# Patient Record
Sex: FEMALE | URBAN - METROPOLITAN AREA
[De-identification: names, ages, dates, MRNs, and addresses within clinical notes are randomized per-mention and may not be internally consistent; named-entity substitution may affect disease eponyms.]

---

## 2017-11-02 ENCOUNTER — TRANSFERRED RECORDS (OUTPATIENT)
Dept: HEALTH INFORMATION MANAGEMENT | Facility: CLINIC | Age: 82
End: 2017-11-02

## 2017-11-06 ENCOUNTER — NURSING HOME VISIT (OUTPATIENT)
Dept: GERIATRICS | Facility: CLINIC | Age: 82
End: 2017-11-06
Payer: COMMERCIAL

## 2017-11-06 VITALS
OXYGEN SATURATION: 97 % | DIASTOLIC BLOOD PRESSURE: 79 MMHG | WEIGHT: 132.4 LBS | RESPIRATION RATE: 20 BRPM | SYSTOLIC BLOOD PRESSURE: 145 MMHG | TEMPERATURE: 97.5 F | HEART RATE: 110 BPM

## 2017-11-06 DIAGNOSIS — M54.50 ACUTE MIDLINE LOW BACK PAIN WITHOUT SCIATICA: ICD-10-CM

## 2017-11-06 DIAGNOSIS — I25.10 CORONARY ARTERY DISEASE INVOLVING NATIVE CORONARY ARTERY OF NATIVE HEART WITHOUT ANGINA PECTORIS: ICD-10-CM

## 2017-11-06 DIAGNOSIS — R41.3 MEMORY DEFICIT: ICD-10-CM

## 2017-11-06 DIAGNOSIS — M15.0 PRIMARY OSTEOARTHRITIS INVOLVING MULTIPLE JOINTS: ICD-10-CM

## 2017-11-06 PROBLEM — R53.82 CHRONIC FATIGUE: Status: ACTIVE | Noted: 2017-05-22

## 2017-11-06 PROBLEM — M79.671 FOOT PAIN, RIGHT: Status: ACTIVE | Noted: 2017-06-06

## 2017-11-06 PROBLEM — M79.641 BILATERAL HAND PAIN: Status: ACTIVE | Noted: 2017-05-22

## 2017-11-06 PROBLEM — M79.642 BILATERAL HAND PAIN: Status: ACTIVE | Noted: 2017-05-22

## 2017-11-06 PROBLEM — M19.90 OSTEOARTHROSIS: Status: ACTIVE | Noted: 2017-11-06

## 2017-11-06 PROCEDURE — 99309 SBSQ NF CARE MODERATE MDM 30: CPT | Performed by: NURSE PRACTITIONER

## 2017-11-06 RX ORDER — ACETAMINOPHEN 325 MG/1
TABLET ORAL
COMMUNITY
Start: 2017-11-04 | End: 2017-11-07

## 2017-11-06 RX ORDER — CALCITONIN SALMON 200 [IU]/.09ML
1 SPRAY, METERED NASAL DAILY
COMMUNITY

## 2017-11-06 NOTE — PROGRESS NOTES
"[place] [facility]  PRIMARY CARE PROVIDER AND CLINIC:  [facility] [place] [facility] [address] [facility] [place]  Chief Complaint   Patient presents with     Clinic Care Coordination - Initial       HPI:    Kristan Aguirre is a 89 year old  (1/11/1920),admitted to the [facility] from Home.   Admitted to this facility for  rehab, medical management and nursing care.  HPI information obtained from: facility chart records, facility staff, patient report and family/first contact daughter, Estrellita Garcia report.  Current issues are:      Compression fracture of vertebra, sequela  Acute midline low back pain without sciatica  Patient had fall in new [facility] elevator on 10/20/17.  Found to have T10 and L1 vertebral compression fracture.  Had persistent pain, unable to manage in home, stayed with daughter, difficult to manage pain, became less mobile, decision made to obtain services at TCU.  Patient has difficulty rating pain.  States it is \"tolerable\".  Sleeping well.  Daughter states she is moving better and not complaining of the pain as much today.  Has participated in physical therapy and OT.  Patient feels this rehab activity is improving her condition/pain.  Appetite good, bowels and bladder moving well, able to control. Denies constipation.  Is not on stool softener despite being on narcotics for over a week.    Memory deficit  Daughter and patient noticing increased short term memory loss over past couple months.  Patient had recently moved into [facility]. Prior to that had been living independently in town house with intermittent assist from neighbors and friends.  SLUMS 23/30.    Primary osteoarthritis involving multiple joints  Chronic condition, managed well with Tylenol    Coronary artery disease involving native coronary artery of native heart without angina pectoris  Denies chest pain.  Has nitro available.       CODE STATUS/ADVANCE DIRECTIVES DISCUSSION:   CPR/Full code   Patient's " living condition: lives alone    ALLERGIES:Clindamycin; Metoprolol; and Penicillins     PAST MEDICAL HISTORY:   Medical History Date Comments   Cervical vertebral fracture (HRC)  Neck Brace   DJD (degenerative joint disease)       ASHD (arteriosclerotic heart disease)       Osteoarthritis   left hip   Abdominal aortic aneurysm (HRC) 3/2011 3.2 cm   Hyperlipidemia LDL goal < 70       Adenomatous colon polyp 2011 small on colonoscopy   Hx of duodenal ulcer 2010          PAST SURGICAL HISTORY:   Surgery Date Laterality Comments   hx total hip replacement     left   BREAST BIOPSY     X2, Neg.   TONSIL AND ADENOIDECTOMY     T & A < 11yo   VEIN STRIPPING     Jose   CORONARY STENT PLACEMENT    2 stents   HYSTERECTOMY 1998   ovaries not removed   CATARACT REMOVAL    Jose Cataract Removal         FAMILY HISTORY:   Cancer Birth Father   kidney   Cancer, Breast Birth Mother       Heart Disease Birth Mother       Other Brother   copd   Good Health Brother         Relation Name Status Comments   Birth Father    (Age 50) kidney cancer   Birth Mother    (Age 73) ashd and breast cancer   Brother    (Age 82) copd   Brother   Alive     Brother         Brother            SOCIAL HISTORY:     Tobacco Use Types Packs/Day Years Used Date   Never Smoker           Smokeless Tobacco: Never Used           Alcohol Use Drinks/Week oz/Week Comments   No           Sex Assigned at Birth Date Recorded   Not on file          Post Discharge Medication Reconciliation Status: discharge medications reconciled, continue medications without change.  Current Outpatient Prescriptions   Medication Sig Dispense Refill     calcitonin, salmon, (MIACALCIN) 200 UNIT/ACT nasal spray Spray 1 spray into one nostril alternating nostrils daily Alternate nostril each day.       Acetaminophen (TYLENOL PO) Take 650 mg by mouth 4 times daily as needed for mild pain or fever       Nitroglycerin (NITROSTAT SL) Place 0.4 mg under  the tongue every 5 minutes as needed for chest pain       oxyCODONE HCl (OXECTA) 5 MG TABA Give 0.5 tablet by mouth every 4 hours as  needed for Pain 1-5       oxyCODONE HCl (OXECTA) 5 MG TABA Give 1 tablet by mouth every 4 hours as  needed for Pain 6-10       acetaminophen (TYLENOL) 325 MG tablet Give 650 mg by mouth as needed for  pain for 3 Days twice a day per standing  house orders         ROS:  10 point ROS of systems including Constitutional, Eyes, Respiratory, Cardiovascular, Gastroenterology, Genitourinary, Integumentary, Muscularskeletal, Psychiatric were all negative except for pertinent positives noted in my HPI.    Exam:  /79  Pulse 110  Temp 97.5  F (36.4  C)  Resp 20  Wt 132 lb 6.4 oz (60.1 kg)  SpO2 97%  GENERAL APPEARANCE:  Alert, in no distress, cooperative  ENT:  Mouth and posterior oropharynx normal, moist mucous membranes, mildly [place], wears glasses  NECK:  No adenopathy,masses or thyromegaly  RESP:  lungs clear to auscultation , no respiratory distress  CV:  regular rate and rhythm, no murmur, rub, or gallop, no edema  ABDOMEN:  normal bowel sounds, soft, nontender, no hepatosplenomegaly or other masses  M/S:   Gait and station abnormal sitting in W/C.  requires SBA for transfers  SKIN:  no concerning lesions noted  NEURO:   Cranial nerves 2-12 are normal tested and grossly at patient's baseline, equal grasp and UE/LE strength  PSYCH:  oriented to self and family.  not date, time, place, memory impaired , affect and mood normal    Lab/Diagnostic data:    XR Lumbar Spine 3 Views (10/24/2017 3:31 PM)  XR Lumbar Spine 3 Views (10/24/2017 3:31 PM)   Specimen Performing Laboratory     [facility]       XR Lumbar Spine 3 Views (10/24/2017 3:31 PM)   Narrative   XR LUMBAR SPINE 3 VIEWS    10/24/2017 3:31 PM        INDICATION: Midline back pain after a fall    COMPARISON: The CT of the abdomen and pelvis 12/12/2015        FINDINGS: 5 lumbar type vertebral bodies. Stable 90% T10 compression  and     40% compression of the L1. No acute vertebral body compression.  No     significant kyphosis.. Stable advanced L3-L4 interbody degeneration. Stable    mild L4-L5 and L5-S1 facet arthropathy.  Prosthetic left hip.       XR Lumbar Spine 3 Views (10/24/2017 3:31 PM)   Procedure Note   Interface, In Rad Results - 10/25/2017 8:53 AM CDT    XR LUMBAR SPINE 3 VIEWS  10/24/2017 3:31 PM    INDICATION: Midline back pain after a fall  COMPARISON: The CT of the abdomen and pelvis 12/12/2015    FINDINGS: 5 lumbar type vertebral bodies. Stable 90% T10 compression and   40% compression of the L1. No acute vertebral body compression. No   significant kyphosis.. Stable advanced L3-L4 interbody degeneration. Stable  mild L4-L5 and L5-S1 facet arthropathy. Prosthetic left hip.     Back to top of Results      XR Knee Rt 2 Views (10/20/2017 1:29 PM)  XR Knee Rt 2 Views (10/20/2017 1:29 PM)   Specimen Performing Laboratory      RADIOLOGY       XR Knee Rt 2 Views (10/20/2017 1:29 PM)   Narrative   XR KNEE RT 2 VIEWS    10/20/2017 1:29 PM        INDICATION: Right knee pain. Fall    COMPARISON: None.        FINDINGS: No fracture, dislocation, or joint effusion. Tricompartmental     chondrocalcinosis and moderate degenerative arthritis. Moderate narrowing     of the lateral compartment. Extensive vascular calcification.       XR Knee Rt 2 Views (10/20/2017 1:29 PM)   Procedure Note   Interface, In Rad Results - 10/20/2017 1:38 PM CDT    XR KNEE RT 2 VIEWS  10/20/2017 1:29 PM    INDICATION: Right knee pain. Fall  COMPARISON: None.    FINDINGS: No fracture, dislocation, or joint effusion. Tricompartmental   chondrocalcinosis and moderate degenerative arthritis. Moderate narrowing   of the lateral compartment. Extensive vascular calcification.     Back to top of Results      CT Head WO IV Cont (10/20/2017 1:21 PM)  CT Head WO IV Cont (10/20/2017 1:21 PM)   Specimen Performing Laboratory      RADIOLOGY       CT Head WO IV Cont  (10/20/2017 1:21 PM)   Narrative   [facility]    CT HEAD WO IV CONT    10/20/2017 1:21 PM        INDICATION: Status post fall, with baseline memory impairment.    TECHNIQUE: Without IV contrast. Dose reduction techniques were used.    CONTRAST: None.    COMPARISON:  None available.        FINDINGS: No intracranial hemorrhage, extraaxial collection, mass effect or    CT evidence of acute territorial infarct.  Age indeterminate lacunar type     infarcts involving the right cerebellar hemisphere and left thalamus, with     additional chronic appearing transcortical infarct of the right     dorsolateral prefrontal cortex. Moderate presumed chronic small vessel     ischemic changes. Mild to moderate generalized volume loss. The ventricles     are proportional to the sulci. Osseous structures are intact. Mild     scattered inflammatory mucosal thickening throughout the paranasal sinuses.    The bilateral temporal bones and orbits are free of significant disease.         CONCLUSION:    1.  No CT finding of mass, acute territorial infarct or hemorrhage.    2.  Age indeterminate lacunar type infarcts involving the right cerebellar     hemisphere and left thalamus, with additional chronic appearing     transcortical infarct of the right dorsolateral prefrontal cortex. If acute    infarct is clinically suspected, consider MRI examination as a more     sensitive means of evaluation.    3.  Age-related changes.       CT Head WO IV Cont (10/20/2017 1:21 PM)   Procedure Note   Interface, In Rad Results - 10/20/2017 1:48 PM CDT    [facility]  CT HEAD WO IV CONT  10/20/2017 1:21 PM    INDICATION: Status post fall, with baseline memory impairment.  TECHNIQUE: Without IV contrast. Dose reduction techniques were used.  CONTRAST: None.  COMPARISON: None available.    FINDINGS: No intracranial hemorrhage, extraaxial collection, mass effect or  CT evidence of acute territorial infarct. Age indeterminate lacunar type    infarcts involving the right cerebellar hemisphere and left thalamus, with   additional chronic appearing transcortical infarct of the right   dorsolateral prefrontal cortex. Moderate presumed chronic small vessel   ischemic changes. Mild to moderate generalized volume loss. The ventricles   are proportional to the sulci. Osseous structures are intact. Mild   scattered inflammatory mucosal thickening throughout the paranasal sinuses.  The bilateral temporal bones and orbits are free of significant disease.     CONCLUSION:  1. No CT finding of mass, acute territorial infarct or hemorrhage.  2. Age indeterminate lacunar type infarcts involving the right cerebellar   hemisphere and left thalamus, with additional chronic appearing   transcortical infarct of the right dorsolateral prefrontal cortex. If acute  infarct is clinically suspected, consider MRI examination as a more   sensitive means of evaluation.  3. Age-related changes.     Back to top of Results      BASIC METABOLIC PANEL (10/20/2017 12:56 PM)  BASIC METABOLIC PANEL (10/20/2017 12:56 PM)   Component Value Ref Range   Sodium 136 136 - 145 mmol/L   Potassium 4.4 3.5 - 5.1 mmol/L   Chloride 104 98 - 109 mmol/L   CO2 24 20 - 29 mmol/L   Anion Gap (calc.) 8 7 - 16 mmol/L   Glucose 99 70 - 180 mg/dl   Calcium 9.5 8.4 - 10.2 mg/dl   BUN 18 7 - 26 mg/dl   Creatinine 0.70 0.55 - 1.02 mg/dl   GFR, Estimated >60 >60 ml/min/1.73m2   GFR, Est., If Black >60 >60 ml/min/1.73m2     BASIC METABOLIC PANEL (10/20/2017 12:56 PM)   Specimen Performing Laboratory     [address] [address]       BASIC METABOLIC PANEL (10/20/2017 12:56 PM)   Narrative   Performed at [facility], [address] [place] [id number]     Back to top of Results      Troponin I (10/20/2017 12:56 PM)  Troponin I (10/20/2017 12:56 PM)   Component Value Ref Range   Troponin I <0.01 0.00 - 0.03 ng/ml     Troponin I (10/20/2017 12:56 PM)   Specimen  Performing Laboratory     [address] [address]       Troponin I (10/20/2017 12:56 PM)   Narrative   Performed at [address] [address]     Back to top of Results      CBC W PLT NO DIFF (10/20/2017 12:56 PM)  CBC W PLT NO DIFF (10/20/2017 12:56 PM)   Component Value Ref Range   WBC 8.1 4.0 - 11.0 k/ul   RBC 5.42 (H) 4.0 - 5.2 M/ul   Hemoglobin 15.8 12.0 - 16.0 g/dl   HCT 47.3 (H) 36.0 - 46.0 %   MCV 87.3 80 - 100 fl   MCH 29.2 26 - 34 pg   MCHC 33.4 32 - 36 g/dl   RDW 14.3 11.5 - 14.5 %   Platelets 160 150 - 450 k/ul   MPV 11.2 (H) 6.5 - 11.0 fl     CBC W PLT NO DIFF (10/20/2017 12:56 PM)   Specimen Performing Laboratory     [address] [address]       CBC W PLT NO DIFF (10/20/2017 12:56 PM)   Narrative   Performed at [address] [address]     Back to top of Results      ECG 12-Lead STAT (10/20/2017 12:49 PM)  ECG 12-Lead STAT (10/20/2017 12:49 PM)   Component Value Ref Range   Ventricular Rate 80 BPM   Atrial Rate 80 BPM   P-R Interval 196 ms   QRS Duration 112 ms    ms   QTc 482 ms   P Axis 58 degrees   R Axis -68 degrees   T Axis 43 degrees     ECG 12-Lead STAT (10/20/2017 12:49 PM)   Specimen Performing Laboratory     [facility]       ECG 12-Lead STAT (10/20/2017 12:49 PM)   Narrative   Sinus rhythm    Left anterior fascicular block    Minimal voltage criteria for LVH, may be normal variant    Abnormal ECG    When compared with ECG of 12-DEC-2015 14:20,    No significant change was found    Confirmed by Andre VALLADARES, Roshan FARRELL ([id number]) on 10/21/2017 2:19:11 PM       ECG 12-Lead STAT (10/20/2017 12:49 PM)   Procedure Note   Interface, In Card And Rad Results - 10/21/2017 2:19 PM CDT    Sinus rhythm  Left anterior fascicular block  Minimal voltage criteria for LVH, may be normal variant  Abnormal ECG  When compared with ECG of 12-DEC-2015 14:20,  No  significant change was found  Confirmed by Andre VALLADARES, Roshan FARRELL ([id number]) on 10/21/2017 2:19:11 PM             ASSESSMENT/PLAN:  Compression fracture of vertebra, sequela  Acute midline low back pain without sciatica  Pain management and rehab.  Add ice/heat for alternative modes of pain management    Memory deficit  OT to continue cognitive testing.  May need to consider alternative living arrangements    Primary osteoarthritis involving multiple joints  Stable, cont POC    Coronary artery disease involving native coronary artery of native heart without angina pectoris  Stable, cont POC       Orders:  Offer ice and/or heat for alternative pain management  Senna-Ducosate up to 1 tab bid PRN constipation    Total time spent with patient visit at the skilled nursing facility was 28 minutes including patient visit, review of past records and discussion with daughter. Greater than 50% of total time spent with counseling and coordinating care due to history, ,admission    Electronically signed by:  EMEKA Raymond CNP

## 2017-11-13 ENCOUNTER — DISCHARGE SUMMARY NURSING HOME (OUTPATIENT)
Dept: GERIATRICS | Facility: CLINIC | Age: 82
End: 2017-11-13
Payer: COMMERCIAL

## 2017-11-13 VITALS
SYSTOLIC BLOOD PRESSURE: 119 MMHG | OXYGEN SATURATION: 97 % | HEART RATE: 92 BPM | WEIGHT: 134.6 LBS | TEMPERATURE: 98 F | RESPIRATION RATE: 17 BRPM | DIASTOLIC BLOOD PRESSURE: 66 MMHG

## 2017-11-13 DIAGNOSIS — M54.50 ACUTE MIDLINE LOW BACK PAIN WITHOUT SCIATICA: ICD-10-CM

## 2017-11-13 DIAGNOSIS — R41.3 MEMORY DEFICIT: ICD-10-CM

## 2017-11-13 PROCEDURE — 99316 NF DSCHRG MGMT 30 MIN+: CPT | Performed by: NURSE PRACTITIONER

## 2017-11-13 RX ORDER — AMOXICILLIN 250 MG
1 CAPSULE ORAL 2 TIMES DAILY PRN
COMMUNITY

## 2017-11-13 NOTE — PROGRESS NOTES
[place] [facility] DISCHARGE SUMMARY    PATIENT'S NAME: Kristan Aguirre  YOB: 1920  MEDICAL RECORD NUMBER:  [id number]    PRIMARY CARE PROVIDER AND CLINIC RESPONSIBLE AFTER TRANSFER: Jason Storey [facility] [address] / [place]    CODE STATUS/ADVANCE DIRECTIVES DISCUSSION:   CPR/Full code        Allergies   Allergen Reactions     Clindamycin      Metoprolol      Other reaction(s): Other, see comments  Hand swelling.      Penicillins        TRANSFERRING PROVIDERS: EMEKA Raymond CNP,   DATE OF SNF ADMISSION:  November / 03 / 2017  DATE OF SNF (anticipated) DISCHARGE: November / 15 / 2017  DISCHARGE DISPOSITION: Non-FMG Provider   Nursing Facility: [facility]   Admitted from  Assisted Living  in [facility].    Condition on Discharge:  Improving.  Function:  Ambulating with 2WW  Cognitive Scores: BIMS 12    Equipment: walker    DISCHARGE DIAGNOSIS:   1. Compression fracture of vertebra, sequela    2. Acute midline low back pain without sciatica    3. Memory deficit        HPI Nursing Facility Course:  HPI information obtained from: facility chart records and family/first contact daughterEstrellita report.  Compression fracture of vertebra, sequela  Acute midline low back pain without sciatica  S/p fall with compression fracture, failed at home, TCU for rehab, pain management. Has made good progress with rehab.  Pain well managed with Tylenol and non-pharmacologic interventions. Patient to live with daughter for 1 week then transfer to [facility] in [place]    Dementia/Memory deficit  BIMS = 12, SLUMS - 23/30 indicating need for 24 hour supervision. Short term memory limited, worsening. F/u with PCP.  May want to consider Namenda or like. This is new or newly noticed/beginning stages.      PAST MEDICAL HISTORY:  has no past medical history on file.    DISCHARGE MEDICATIONS:  Current Outpatient Prescriptions   Medication Sig  Dispense Refill     senna-docusate (SENOKOT-S;PERICOLACE) 8.6-50 MG per tablet Take 1 tablet by mouth 2 times daily as needed for constipation       calcitonin, salmon, (MIACALCIN) 200 UNIT/ACT nasal spray Spray 1 spray into one nostril alternating nostrils daily Alternate nostril each day.       Acetaminophen (TYLENOL PO) Take 650 mg by mouth 4 times daily as needed for mild pain or fever       Nitroglycerin (NITROSTAT SL) Place 0.4 mg under the tongue every 5 minutes as needed for chest pain       oxyCODONE HCl (OXECTA) 5 MG TABA Give 0.5 tablet by mouth every 4 hours as  needed for Pain 1-5       oxyCODONE HCl (OXECTA) 5 MG TABA Give 1 tablet by mouth every 4 hours as  needed for Pain 6-10         MEDICATION CHANGES/RATIONALE:   Dc narcotics as pain is well managed  Controlled medications sent with patient: sent with 29 1/2 tabs of Oxycodone.  No scripts as has not needed narcotics in 3 days.  Anticipate decreased need.     ROS:    4 point ROS including Respiratory, CV, GI and , other than that noted in the HPI,  is negative    Physical Exam:   Vitals: /66  Pulse 92  Temp 98  F (36.7  C)  Resp 17  Wt 134 lb 9.6 oz (61.1 kg)  SpO2 97%  BMI= There is no height or weight on file to calculate BMI.    GENERAL APPEARANCE:  Alert, in no distress, cooperative, cheerful, covers for memory lapses by asking questions or agreeing  ENT:  Mouth and posterior oropharynx normal, moist mucous membranes, slightly HO  RESP:  lungs clear to auscultation , no respiratory distress  CV:  regular rate and rhythm, no murmur, rub, or gallop, no edema  ABDOMEN:  normal bowel sounds, soft, nontender, no hepatosplenomegaly or other masses  M/S:   Gait and station abnormal using 2ww. had baeen using w/c  SKIN:  no concerning lesions  NEURO:   Cranial nerves 2-12 are normal tested and grossly at patient's baseline  PSYCH:  oriented to self, family, memory impaired , affect and mood normal    DISCHARGE PLAN:  Discharge back to [place]  in [facility]  Patient instructed to follow-up with:  PCP in 7 days        Discharge Treatments:home care, physical therapy vilma/treat, RN for home safety, meds      Documentation of Face-to-Face and Certification for Home Health Services     Patient: Kristan Aguirre   YOB: 1920  MR Number: [id number]  Today's Date: 11/13/2017    I certify that patient: Kristan Aguirre is under my care and that I, or a nurse practitioner or physician's assistant working with me, had a face-to-face encounter that meets the physician face-to-face encounter requirements with this patient on: 11/13/2017.    This encounter with the patient was in whole, or in part, for the following medical condition, which is the primary reason for home health care: TCU d/c.  S/p fall with compression fracture.    I certify that, based on my findings, the following services are medically necessary home health services: Nursing and Physical Therapy.    My clinical findings support the need for the above services because: Nurse is needed: To assess medications, home safety after changes in medications or other medical regimen.. and Physical Therapy Services are needed to assess and treat the following functional impairments: ambulation.    Further, I certify that my clinical findings support that this patient is homebound (i.e. absences from home require considerable and taxing effort and are for medical reasons or [facility] services or infrequently or of short duration when for other reasons) because: Requires assistance of another person or specialized equipment to access medical services because patient:  pain, poor endurance..    Based on the above findings. I certify that this patient is confined to the home and needs intermittent skilled nursing care, physical therapy and/or speech therapy.  The patient is under my care, and I have initiated the establishment of the plan of care.  This patient will be followed by a physician  who will periodically review the plan of care.  Physician/Provider to provide follow up care: Jason Storey Medicare certified PECOS Physician: Tasha Ernandez CNP  Physician Signature: See electronic signature associated with these discharge orders.  Date: 11/13/2017    TOTAL DISCHARGE TIME:   Greater than 30 minutes  Electronically signed by:  EMEKA Raymond CNP

## 2018-06-21 ENCOUNTER — RECORDS - HEALTHEAST (OUTPATIENT)
Dept: LAB | Facility: CLINIC | Age: 83
End: 2018-06-21

## 2018-06-21 LAB
ALBUMIN UR-MCNC: NEGATIVE MG/DL
ANION GAP SERPL CALCULATED.3IONS-SCNC: 8 MMOL/L (ref 5–18)
APPEARANCE UR: CLEAR
BACTERIA #/AREA URNS HPF: ABNORMAL HPF
BASOPHILS # BLD AUTO: 0 THOU/UL (ref 0–0.2)
BASOPHILS NFR BLD AUTO: 0 % (ref 0–2)
BILIRUB UR QL STRIP: NEGATIVE
BUN SERPL-MCNC: 15 MG/DL (ref 8–28)
CALCIUM SERPL-MCNC: 10 MG/DL (ref 8.5–10.5)
CHLORIDE BLD-SCNC: 101 MMOL/L (ref 98–107)
CO2 SERPL-SCNC: 31 MMOL/L (ref 22–31)
COLOR UR AUTO: YELLOW
CREAT SERPL-MCNC: 0.72 MG/DL (ref 0.6–1.1)
EOSINOPHIL # BLD AUTO: 0.2 THOU/UL (ref 0–0.4)
EOSINOPHIL NFR BLD AUTO: 3 % (ref 0–6)
ERYTHROCYTE [DISTWIDTH] IN BLOOD BY AUTOMATED COUNT: 14.6 % (ref 11–14.5)
GFR SERPL CREATININE-BSD FRML MDRD: >60 ML/MIN/1.73M2
GLUCOSE BLD-MCNC: 82 MG/DL (ref 70–125)
GLUCOSE UR STRIP-MCNC: NEGATIVE MG/DL
HCT VFR BLD AUTO: 44.5 % (ref 35–47)
HGB BLD-MCNC: 14.3 G/DL (ref 12–16)
HGB UR QL STRIP: NEGATIVE
KETONES UR STRIP-MCNC: NEGATIVE MG/DL
LEUKOCYTE ESTERASE UR QL STRIP: ABNORMAL
LYMPHOCYTES # BLD AUTO: 3 THOU/UL (ref 0.8–4.4)
LYMPHOCYTES NFR BLD AUTO: 39 % (ref 20–40)
MCH RBC QN AUTO: 28.3 PG (ref 27–34)
MCHC RBC AUTO-ENTMCNC: 32.1 G/DL (ref 32–36)
MCV RBC AUTO: 88 FL (ref 80–100)
MONOCYTES # BLD AUTO: 0.5 THOU/UL (ref 0–0.9)
MONOCYTES NFR BLD AUTO: 6 % (ref 2–10)
MUCOUS THREADS #/AREA URNS LPF: ABNORMAL LPF
NEUTROPHILS # BLD AUTO: 3.8 THOU/UL (ref 2–7.7)
NEUTROPHILS NFR BLD AUTO: 52 % (ref 50–70)
NITRATE UR QL: NEGATIVE
PH UR STRIP: 6 [PH] (ref 4.5–8)
PLATELET # BLD AUTO: 204 THOU/UL (ref 140–440)
PMV BLD AUTO: 11.7 FL (ref 8.5–12.5)
POTASSIUM BLD-SCNC: 3.4 MMOL/L (ref 3.5–5)
RBC # BLD AUTO: 5.06 MILL/UL (ref 3.8–5.4)
RBC #/AREA URNS AUTO: ABNORMAL HPF
SODIUM SERPL-SCNC: 140 MMOL/L (ref 136–145)
SP GR UR STRIP: 1.01 (ref 1–1.03)
SQUAMOUS #/AREA URNS AUTO: ABNORMAL LPF
UROBILINOGEN UR STRIP-ACNC: ABNORMAL
WBC #/AREA URNS AUTO: ABNORMAL HPF
WBC: 7.5 THOU/UL (ref 4–11)

## 2018-06-22 LAB — BACTERIA SPEC CULT: NO GROWTH

## 2018-06-27 ENCOUNTER — RECORDS - HEALTHEAST (OUTPATIENT)
Dept: LAB | Facility: CLINIC | Age: 83
End: 2018-06-27

## 2018-06-27 LAB
ANION GAP SERPL CALCULATED.3IONS-SCNC: 6 MMOL/L (ref 5–18)
BUN SERPL-MCNC: 12 MG/DL (ref 8–28)
CALCIUM SERPL-MCNC: 9.6 MG/DL (ref 8.5–10.5)
CHLORIDE BLD-SCNC: 102 MMOL/L (ref 98–107)
CO2 SERPL-SCNC: 30 MMOL/L (ref 22–31)
CREAT SERPL-MCNC: 0.74 MG/DL (ref 0.6–1.1)
GFR SERPL CREATININE-BSD FRML MDRD: >60 ML/MIN/1.73M2
GLUCOSE BLD-MCNC: 77 MG/DL (ref 70–125)
POTASSIUM BLD-SCNC: 4 MMOL/L (ref 3.5–5)
SODIUM SERPL-SCNC: 138 MMOL/L (ref 136–145)

## 2019-03-18 ENCOUNTER — RECORDS - HEALTHEAST (OUTPATIENT)
Dept: LAB | Facility: CLINIC | Age: 84
End: 2019-03-18

## 2019-03-18 LAB
ANION GAP SERPL CALCULATED.3IONS-SCNC: 6 MMOL/L (ref 5–18)
BNP SERPL-MCNC: 27 PG/ML (ref 0–167)
BUN SERPL-MCNC: 20 MG/DL (ref 8–28)
CALCIUM SERPL-MCNC: 9.2 MG/DL (ref 8.5–10.5)
CHLORIDE BLD-SCNC: 107 MMOL/L (ref 98–107)
CO2 SERPL-SCNC: 26 MMOL/L (ref 22–31)
CREAT SERPL-MCNC: 0.68 MG/DL (ref 0.6–1.1)
ERYTHROCYTE [DISTWIDTH] IN BLOOD BY AUTOMATED COUNT: 14.7 % (ref 11–14.5)
GFR SERPL CREATININE-BSD FRML MDRD: >60 ML/MIN/1.73M2
GLUCOSE BLD-MCNC: 77 MG/DL (ref 70–125)
HCT VFR BLD AUTO: 44.9 % (ref 35–47)
HGB BLD-MCNC: 14.2 G/DL (ref 12–16)
MCH RBC QN AUTO: 28.2 PG (ref 27–34)
MCHC RBC AUTO-ENTMCNC: 31.6 G/DL (ref 32–36)
MCV RBC AUTO: 89 FL (ref 80–100)
PLATELET # BLD AUTO: 158 THOU/UL (ref 140–440)
PMV BLD AUTO: 12.2 FL (ref 8.5–12.5)
POTASSIUM BLD-SCNC: 4.1 MMOL/L (ref 3.5–5)
RBC # BLD AUTO: 5.04 MILL/UL (ref 3.8–5.4)
SODIUM SERPL-SCNC: 139 MMOL/L (ref 136–145)
WBC: 5.1 THOU/UL (ref 4–11)